# Patient Record
Sex: MALE | Race: BLACK OR AFRICAN AMERICAN | NOT HISPANIC OR LATINO | URBAN - METROPOLITAN AREA
[De-identification: names, ages, dates, MRNs, and addresses within clinical notes are randomized per-mention and may not be internally consistent; named-entity substitution may affect disease eponyms.]

---

## 2019-03-26 ENCOUNTER — EMERGENCY (EMERGENCY)
Facility: HOSPITAL | Age: 59
LOS: 1 days | Discharge: HOME | End: 2019-03-26
Attending: EMERGENCY MEDICINE
Payer: COMMERCIAL

## 2019-03-26 VITALS
HEIGHT: 67 IN | DIASTOLIC BLOOD PRESSURE: 79 MMHG | WEIGHT: 201.94 LBS | SYSTOLIC BLOOD PRESSURE: 144 MMHG | RESPIRATION RATE: 16 BRPM | HEART RATE: 91 BPM | OXYGEN SATURATION: 96 % | TEMPERATURE: 98 F

## 2019-03-26 PROCEDURE — 24605 TX CLSD ELBOW DISLC REQ ANES: CPT

## 2019-03-26 PROCEDURE — 73080 X-RAY EXAM OF ELBOW: CPT | Mod: 26,LT

## 2019-03-26 PROCEDURE — 99285 EMERGENCY DEPT VISIT HI MDM: CPT

## 2019-03-26 RX ORDER — OXYCODONE AND ACETAMINOPHEN 5; 325 MG/1; MG/1
1 TABLET ORAL ONCE
Qty: 0 | Refills: 0 | Status: DISCONTINUED | OUTPATIENT
Start: 2019-03-26 | End: 2019-03-26

## 2019-03-26 RX ORDER — PROPOFOL 10 MG/ML
50 INJECTION, EMULSION INTRAVENOUS ONCE
Qty: 0 | Refills: 0 | Status: COMPLETED | OUTPATIENT
Start: 2019-03-26 | End: 2019-03-27

## 2019-03-26 RX ADMIN — OXYCODONE AND ACETAMINOPHEN 1 TABLET(S): 5; 325 TABLET ORAL at 22:43

## 2019-03-26 NOTE — ED ADULT NURSE NOTE - OBJECTIVE STATEMENT
Pt c/o left elbow injury at work. pt s/p tripped and fall,caught the foot at the carpet as per patient.

## 2019-03-27 VITALS
SYSTOLIC BLOOD PRESSURE: 144 MMHG | RESPIRATION RATE: 20 BRPM | OXYGEN SATURATION: 99 % | HEART RATE: 75 BPM | DIASTOLIC BLOOD PRESSURE: 63 MMHG

## 2019-03-27 PROCEDURE — 82962 GLUCOSE BLOOD TEST: CPT

## 2019-03-27 PROCEDURE — 73080 X-RAY EXAM OF ELBOW: CPT

## 2019-03-27 PROCEDURE — 24605 TX CLSD ELBOW DISLC REQ ANES: CPT | Mod: LT

## 2019-03-27 PROCEDURE — 73070 X-RAY EXAM OF ELBOW: CPT

## 2019-03-27 PROCEDURE — 73070 X-RAY EXAM OF ELBOW: CPT | Mod: 26,LT

## 2019-03-27 PROCEDURE — 99285 EMERGENCY DEPT VISIT HI MDM: CPT | Mod: 25

## 2019-03-27 RX ORDER — PROPOFOL 10 MG/ML
75 INJECTION, EMULSION INTRAVENOUS ONCE
Qty: 0 | Refills: 0 | Status: COMPLETED | OUTPATIENT
Start: 2019-03-27 | End: 2019-03-27

## 2019-03-27 RX ORDER — IBUPROFEN 200 MG
1 TABLET ORAL
Qty: 40 | Refills: 0 | OUTPATIENT
Start: 2019-03-27 | End: 2019-04-05

## 2019-03-27 RX ORDER — PROPOFOL 10 MG/ML
25 INJECTION, EMULSION INTRAVENOUS ONCE
Qty: 0 | Refills: 0 | Status: COMPLETED | OUTPATIENT
Start: 2019-03-27 | End: 2019-03-27

## 2019-03-27 RX ADMIN — PROPOFOL 75 MILLIGRAM(S): 10 INJECTION, EMULSION INTRAVENOUS at 03:10

## 2019-03-27 RX ADMIN — OXYCODONE AND ACETAMINOPHEN 1 TABLET(S): 5; 325 TABLET ORAL at 00:01

## 2019-03-27 RX ADMIN — PROPOFOL 25 MILLIGRAM(S): 10 INJECTION, EMULSION INTRAVENOUS at 00:05

## 2019-03-27 RX ADMIN — PROPOFOL 50 MILLIGRAM(S): 10 INJECTION, EMULSION INTRAVENOUS at 00:05

## 2019-03-27 NOTE — ED PROVIDER NOTE - NSFOLLOWUPINSTRUCTIONS_ED_ALL_ED_FT
Keep arm in splint until removed by orthopedist above. Keep splint dry/cover with plastic bags while showering.  Call orthopedist above for reevaluation next week.      Return to ED if you develop worsening arm/hand pain/numbness or notice fingers turn pale or blue.

## 2019-03-27 NOTE — ED PROVIDER NOTE - CARE PLAN
Principal Discharge DX:	Elbow dislocation, left, initial encounter  Secondary Diagnosis:	Elbow fracture, left, closed, initial encounter

## 2019-03-27 NOTE — ED PROVIDER NOTE - OBJECTIVE STATEMENT
59 y/o M with no significant PMHx or PSHx presents to the ED with complaints of L elbow pain. Patient reports around 15:00 tripped and fell onto L arm at which time had immediate pain. Patient went to [facility] where X-ray noted fracture and dislocation at elbow; patient was advised to present to the ED for further evaluation. Patient is right hand dominant. Denies any other acute complaints. NKDA.

## 2019-03-27 NOTE — ED PROCEDURE NOTE - NS_POSTPROCCAREGUIDE_ED_ALL_ED
Patient is now fully awake, with vital signs and temperature stable, hydration is adequate, patients Douglas’s  score is at baseline (or greater than 8), patient and escort has received  discharge education.

## 2019-03-27 NOTE — ED PROVIDER NOTE - CLINICAL SUMMARY MEDICAL DECISION MAKING FREE TEXT BOX
57 y/o M presents with L elbow injury. X-ray confirms fracture and dislocation. Discussed with orthopedic, Dr. Noriega who recommended reduction of dislocation in the ED, posterior splint and outpatient follow up in 1 week. 59 y/o M presents with L elbow injury. X-ray confirms fracture and dislocation. Discussed with orthopedic, Dr. Noriega who recommended reduction of dislocation in the ED, posterior splint and outpatient follow up in 1 week.    procedural sedation and successful reduction  performed however prior to splint placement pt extended elbow which resulted in subsequent dislocation, on 2nd attempt, elbow reduced, splint place. XR confirms dislocation reduction.   Patient observed 30min after sedation, tolerating po w/o vomiting, stable for discharge to f/u with ortho as outpatient.

## 2019-03-27 NOTE — ED PROCEDURE NOTE - CPROC ED INFORMED CONSENT1
Benefits, risks, and possible complications of procedure explained to patient/caregiver who verbalized understanding and gave verbal consent. Benefits, risks, and possible complications of procedure explained to patient/caregiver who verbalized understanding and gave written consent.

## 2019-03-27 NOTE — ED PROVIDER NOTE - PHYSICAL EXAMINATION
2+ pulses. Normal radial, median and ulnar function. Limited ROM at L elbow. Moderate swelling inferior to L elbow.

## 2019-03-27 NOTE — ED PROVIDER NOTE - CARE PROVIDER_API CALL
Salvatore Noriega)  Orthopaedic Surgery  [address] [place] [address], [address]  [place] [address]  Phone: [phone]  Fax: [phone]  Follow Up Time: 4-6 Days

## 2019-03-27 NOTE — ED PROCEDURE NOTE - NS_EDPROVIDERDISPOUSERTYPE_ED_A_ED
Addended by: DEV WADE on: 1/15/2019 05:18 PM     Modules accepted: Orders     Scribe Attestation (For Scribes USE Only)... Attending Attestation (For Attendings USE Only).../Scribe Attestation (For Scribes USE Only)...

## 2019-03-27 NOTE — ED ADULT NURSE REASSESSMENT NOTE - NS ED NURSE REASSESS COMMENT FT1
Procedural conscious sedation conducted on the patient. Post sedation pt awake, Pt tolerated fluids, hemodynamically stable . pt not in distress.

## 2019-03-27 NOTE — ED PROCEDURE NOTE - CPROC ED POST RADIOGRAPHY1
post procedure radiography not performed/joint reduced post-procedure radiography performed/joint reduced

## 2019-06-05 NOTE — ED PROVIDER NOTE - NS CPE EDP MUSC SPINE EXAM
911 or go to the nearest Emergency Room
no deformity, pain or tenderness. no restriction of movement

## 2021-08-01 NOTE — ED PROCEDURE NOTE - NS ED PERI VASCULAR NEG
fingers/toes warm to touch/capillary refill time < 2 seconds/no cyanosis of extremity/no paresthesia no

## 2023-09-19 NOTE — ED PROVIDER NOTE - ATTESTATION, MLM
Hub staff attempted to follow warm transfer process and was unsuccessful     Caller: David Sam    Relationship to patient: Emergency Contact    Best call back number: [phone]    Patient is needing: PATIENT'S WIFE DAVID CALLED PATIENT HAD A PARACENTESIS DONE YESTERDAY 9-18-23, PATIENT HAS STILL YET TO HAVE A BOWEL MOVEMENT FOR TWO DAYS AND IN PAIN AROUND WHERE IS [facility] IS LOCATED. IS THERE SOMETHING YOU CAN RECOMMEND OR CALL IN FOR PATIENT TO TAKE TO GET SOME RELIEF AND HAVE A BOWEL MOVEMENT. PLEASE CALL WIFE BACK ASAP          
I returned patient phone call and spoke with his wife. I instructed her to start the milk of magnesium twice a day per Nilton BENAVIDES. Patient's wife verbalized understanding and had no further questions. I also encouraged her to call us back if she has any issues or concerns. Patient wife agreed.  
I have reviewed and confirmed nurses' notes for patient's medications, allergies, medical history, and surgical history.

## 2023-10-19 NOTE — ED ADULT NURSE NOTE - NSFALLRSKASSESSDT_ED_ALL_ED
Patients GI provider:  Dr. Jabier Mccall    Number to return call: [phone]    Reason for call: Pt returning call to discuss results of recent colonoscopy. Was unable to get a hold of anyone in [facility], spoke with Satish Mccormack from [facility] clerical advised to put in task. Please call pt back to discuss results.     Scheduled procedure/appointment date if applicable: N/A 26-Mar-2019 22:11